# Patient Record
(demographics unavailable — no encounter records)

---

## 2025-06-22 NOTE — HISTORY OF PRESENT ILLNESS
[de-identified] : CC: oral lesion   HISTORY OF PRESENT ILLNESS: MARY STEELE is a 62 year old male who presents today with oral lesion referred by Dr. Infante from [facility] [place], mandarin speaking patient is edentulous in his upper dentition and uses dentures for several months he has had a wound at the upper (labial) frenulum and it's tender it does not bleed and he has had this current denture for several years a new one has been fabricated   REVIEW OF SYSTEMS: General ROS: negative for - chills, fatigue or fever Psychological ROS: negative for - anxiety or depression Ophthalmic ROS: negative for - blurry vision, decreased vision or double vision ENT ROS: negative except as noted from HPI Allergy and Immunology ROS: negative except as noted from HPI Hematological and Lymphatic ROS: negative for - bleeding problems Endocrine ROS: negative for - malaise/lethargy Respiratory ROS: negative for - stridor Cardiovascular ROS: negative for - chest pain Gastrointestinal ROS: negative for - appetite loss or nausea/vomiting Genitourinary ROS: negative for - incontinence Musculoskeletal ROS: negative for - gait disturbance Neurological ROS: negative for - behavioral changes Dermatological ROS: negative for - nail changes   Physical Exam:   GENERAL APPEARANCE: Well-developed and No Acute Distress. COMMUNICATION: Able to Communicate. Strong Voice.   HEAD AND FACE Eyes: Testing of ocular motility including primary gaze alignment normal. Inspection and Appearance: No evidence of lesions or masses Palpation: Palpation of the face reveals no sinus tenderness Salivary Glands: Symmetric without masses Facial Strength: Symmetric without evidence of facial paralysis   EAR, NOSE, MOUTH, and THROAT: Ear Canals and Tympanic Membranes, Bilateral: No evidence of inflammation or lesions. Thresholds: Clinical speech reception thresholds normal. External, Nose and Auricle: No lesions or masses. Mouth: at the labial frenulum, the overlying mucosa of the frenulum is eroded but there is no ulceration. only some exposed unerlying muscle fiber noted. no bleeding. no exophytic lesion.    NECK: Evaluation: No evidence of masses or crepitus. The neck is symmetric and the trachea is in the midline position. Thyroid: No evidence of enlargement, tenderness or mass. Neck Lymph Nodes: WNL. Respiratory: Inspection of the chest including symmetry, expansion and/or assessment of respiratory effort normal. Cardiovascular: Evaluation of peripheral vascular system by observation and palpation of capillary refill, normal. Neurological/Psychiatric: Alert, Oriented, Mood, and Affect Normal.     IMPRESSION: Mr. Steele is a pleasant 62 year old gentleman with upper labial frenulum injury, possibly 2/2 poor fitting dentures   PLAN: -reassurance -speak with dentist to file down the denture or apply the new one -RTC as needed     Jer Medina MD [facility] Rhinology and Skull Base Surgery Department of Otolaryngology- Head and Neck Surgery [facility]

## 2025-06-22 NOTE — HISTORY OF PRESENT ILLNESS
[de-identified] : CC: oral lesion   HISTORY OF PRESENT ILLNESS: MARY STEELE is a 62 year old male who presents today with oral lesion referred by Dr. Infante from [facility], mandarin speaking patient is edentulous in his upper dentition and uses dentures for several months he has had a wound at the upper (labial) frenulum and it's tender it does not bleed and he has had this current denture for several years a new one has been fabricated   REVIEW OF SYSTEMS: General ROS: negative for - chills, fatigue or fever Psychological ROS: negative for - anxiety or depression Ophthalmic ROS: negative for - blurry vision, decreased vision or double vision ENT ROS: negative except as noted from HPI Allergy and Immunology ROS: negative except as noted from HPI Hematological and Lymphatic ROS: negative for - bleeding problems Endocrine ROS: negative for - malaise/lethargy Respiratory ROS: negative for - stridor Cardiovascular ROS: negative for - chest pain Gastrointestinal ROS: negative for - appetite loss or nausea/vomiting Genitourinary ROS: negative for - incontinence Musculoskeletal ROS: negative for - gait disturbance Neurological ROS: negative for - behavioral changes Dermatological ROS: negative for - nail changes   Physical Exam:   GENERAL APPEARANCE: Well-developed and No Acute Distress. COMMUNICATION: Able to Communicate. Strong Voice.   HEAD AND FACE Eyes: Testing of ocular motility including primary gaze alignment normal. Inspection and Appearance: No evidence of lesions or masses Palpation: Palpation of the face reveals no sinus tenderness Salivary Glands: Symmetric without masses Facial Strength: Symmetric without evidence of facial paralysis   EAR, NOSE, MOUTH, and THROAT: Ear Canals and Tympanic Membranes, Bilateral: No evidence of inflammation or lesions. Thresholds: Clinical speech reception thresholds normal. External, Nose and Auricle: No lesions or masses. Mouth: at the labial frenulum, the overlying mucosa of the frenulum is eroded but there is no ulceration. only some exposed unerlying muscle fiber noted. no bleeding. no exophytic lesion.    NECK: Evaluation: No evidence of masses or crepitus. The neck is symmetric and the trachea is in the midline position. Thyroid: No evidence of enlargement, tenderness or mass. Neck Lymph Nodes: WNL. Respiratory: Inspection of the chest including symmetry, expansion and/or assessment of respiratory effort normal. Cardiovascular: Evaluation of peripheral vascular system by observation and palpation of capillary refill, normal. Neurological/Psychiatric: Alert, Oriented, Mood, and Affect Normal.     IMPRESSION: Mr. Steele is a pleasant 62 year old gentleman with upper labial frenulum injury, possibly 2/2 poor fitting dentures   PLAN: -reassurance -speak with dentist to file down the denture or apply the new one -RTC as needed     Jer Medina MD [facility] Rhinology and Skull Base Surgery Department of Otolaryngology- Head and Neck Surgery [facility]

## 2025-07-07 NOTE — HISTORY OF PRESENT ILLNESS
[de-identified] : CC: oral lesion   HISTORY OF PRESENT ILLNESS: MARY STEELE is a 62 year old male who presents today with oral lesion referred by Dr. Infante from [facility], mandarin speaking patient is edentulous in his upper dentition and uses dentures for several months he has had a wound at the upper (labial) frenulum and it's tender it does not bleed and he has had this current denture for several years a new one has been fabricated   2 week f/u he still has significant pain the superficial mucosal irritation has resolved  REVIEW OF SYSTEMS: General ROS: negative for - chills, fatigue or fever Psychological ROS: negative for - anxiety or depression Ophthalmic ROS: negative for - blurry vision, decreased vision or double vision ENT ROS: negative except as noted from HPI Allergy and Immunology ROS: negative except as noted from HPI Hematological and Lymphatic ROS: negative for - bleeding problems Endocrine ROS: negative for - malaise/lethargy Respiratory ROS: negative for - stridor Cardiovascular ROS: negative for - chest pain Gastrointestinal ROS: negative for - appetite loss or nausea/vomiting Genitourinary ROS: negative for - incontinence Musculoskeletal ROS: negative for - gait disturbance Neurological ROS: negative for - behavioral changes Dermatological ROS: negative for - nail changes   Physical Exam:   GENERAL APPEARANCE: Well-developed and No Acute Distress. COMMUNICATION: Able to Communicate. Strong Voice.   HEAD AND FACE Eyes: Testing of ocular motility including primary gaze alignment normal. Inspection and Appearance: No evidence of lesions or masses Palpation: Palpation of the face reveals no sinus tenderness Salivary Glands: Symmetric without masses Facial Strength: Symmetric without evidence of facial paralysis   EAR, NOSE, MOUTH, and THROAT: Ear Canals and Tympanic Membranes, Bilateral: No evidence of inflammation or lesions. Thresholds: Clinical speech reception thresholds normal. External, Nose and Auricle: No lesions or masses. Mouth: at the labial frenulum, the overlying mucosa of the frenulum has healedthere is no ulceration. only very mild tenderness on palpation of the alvelous. no bleeding. no exophytic lesion.    NECK: Evaluation: No evidence of masses or crepitus. The neck is symmetric and the trachea is in the midline position. Thyroid: No evidence of enlargement, tenderness or mass. Neck Lymph Nodes: WNL. Respiratory: Inspection of the chest including symmetry, expansion and/or assessment of respiratory effort normal. Cardiovascular: Evaluation of peripheral vascular system by observation and palpation of capillary refill, normal. Neurological/Psychiatric: Alert, Oriented, Mood, and Affect Normal.     IMPRESSION: Mr. Steele is a pleasant 62 year old gentleman with upper labial frenulum injury, possibly 2/2 poor fitting dentures   PLAN: -due to persistent pain, we will obtain imaging -CT maxface with contrast -TTM after CT     Jer Medina MD [facility] Rhinology and Skull Base Surgery Department of Otolaryngology- Head and Neck Surgery [facility]

## 2025-07-07 NOTE — HISTORY OF PRESENT ILLNESS
[de-identified] : CC: oral lesion   HISTORY OF PRESENT ILLNESS: MARY STEELE is a 62 year old male who presents today with oral lesion referred by Dr. Infante from [facility] [place], mandarin speaking patient is edentulous in his upper dentition and uses dentures for several months he has had a wound at the upper (labial) frenulum and it's tender it does not bleed and he has had this current denture for several years a new one has been fabricated   2 week f/u he still has significant pain the superficial mucosal irritation has resolved  REVIEW OF SYSTEMS: General ROS: negative for - chills, fatigue or fever Psychological ROS: negative for - anxiety or depression Ophthalmic ROS: negative for - blurry vision, decreased vision or double vision ENT ROS: negative except as noted from HPI Allergy and Immunology ROS: negative except as noted from HPI Hematological and Lymphatic ROS: negative for - bleeding problems Endocrine ROS: negative for - malaise/lethargy Respiratory ROS: negative for - stridor Cardiovascular ROS: negative for - chest pain Gastrointestinal ROS: negative for - appetite loss or nausea/vomiting Genitourinary ROS: negative for - incontinence Musculoskeletal ROS: negative for - gait disturbance Neurological ROS: negative for - behavioral changes Dermatological ROS: negative for - nail changes   Physical Exam:   GENERAL APPEARANCE: Well-developed and No Acute Distress. COMMUNICATION: Able to Communicate. Strong Voice.   HEAD AND FACE Eyes: Testing of ocular motility including primary gaze alignment normal. Inspection and Appearance: No evidence of lesions or masses Palpation: Palpation of the face reveals no sinus tenderness Salivary Glands: Symmetric without masses Facial Strength: Symmetric without evidence of facial paralysis   EAR, NOSE, MOUTH, and THROAT: Ear Canals and Tympanic Membranes, Bilateral: No evidence of inflammation or lesions. Thresholds: Clinical speech reception thresholds normal. External, Nose and Auricle: No lesions or masses. Mouth: at the labial frenulum, the overlying mucosa of the frenulum has healedthere is no ulceration. only very mild tenderness on palpation of the alvelous. no bleeding. no exophytic lesion.    NECK: Evaluation: No evidence of masses or crepitus. The neck is symmetric and the trachea is in the midline position. Thyroid: No evidence of enlargement, tenderness or mass. Neck Lymph Nodes: WNL. Respiratory: Inspection of the chest including symmetry, expansion and/or assessment of respiratory effort normal. Cardiovascular: Evaluation of peripheral vascular system by observation and palpation of capillary refill, normal. Neurological/Psychiatric: Alert, Oriented, Mood, and Affect Normal.     IMPRESSION: Mr. Steele is a pleasant 62 year old gentleman with upper labial frenulum injury, possibly 2/2 poor fitting dentures   PLAN: -due to persistent pain, we will obtain imaging -CT maxface with contrast -TTM after CT     Jer Medina MD [facility] Rhinology and Skull Base Surgery Department of Otolaryngology- Head and Neck Surgery [facility]